# Patient Record
Sex: MALE | Race: WHITE | NOT HISPANIC OR LATINO | Employment: UNEMPLOYED | URBAN - METROPOLITAN AREA
[De-identification: names, ages, dates, MRNs, and addresses within clinical notes are randomized per-mention and may not be internally consistent; named-entity substitution may affect disease eponyms.]

---

## 2023-04-30 ENCOUNTER — HOSPITAL ENCOUNTER (EMERGENCY)
Facility: CLINIC | Age: 33
Discharge: HOME | End: 2023-04-30
Attending: EMERGENCY MEDICINE | Admitting: EMERGENCY MEDICINE

## 2023-04-30 VITALS
SYSTOLIC BLOOD PRESSURE: 131 MMHG | DIASTOLIC BLOOD PRESSURE: 86 MMHG | RESPIRATION RATE: 18 BRPM | HEART RATE: 124 BPM | OXYGEN SATURATION: 97 %

## 2023-04-30 DIAGNOSIS — F10.10 ALCOHOL ABUSE: ICD-10-CM

## 2023-04-30 DIAGNOSIS — F22 PARANOIA (H): ICD-10-CM

## 2023-04-30 LAB — ALCOHOL BREATH TEST: 0 (ref 0–0.01)

## 2023-04-30 PROCEDURE — 250N000013 HC RX MED GY IP 250 OP 250 PS 637: Performed by: EMERGENCY MEDICINE

## 2023-04-30 PROCEDURE — 90791 PSYCH DIAGNOSTIC EVALUATION: CPT

## 2023-04-30 PROCEDURE — 99284 EMERGENCY DEPT VISIT MOD MDM: CPT | Performed by: EMERGENCY MEDICINE

## 2023-04-30 PROCEDURE — 99285 EMERGENCY DEPT VISIT HI MDM: CPT | Mod: 25 | Performed by: EMERGENCY MEDICINE

## 2023-04-30 RX ORDER — OLANZAPINE 10 MG/1
10 TABLET, ORALLY DISINTEGRATING ORAL ONCE
Status: COMPLETED | OUTPATIENT
Start: 2023-04-30 | End: 2023-04-30

## 2023-04-30 RX ORDER — DIVALPROEX SODIUM 500 MG/1
500 TABLET, EXTENDED RELEASE ORAL DAILY
COMMUNITY

## 2023-04-30 RX ADMIN — OLANZAPINE 10 MG: 10 TABLET, ORALLY DISINTEGRATING ORAL at 01:53

## 2023-04-30 RX ADMIN — OLANZAPINE 10 MG: 10 TABLET, ORALLY DISINTEGRATING ORAL at 07:02

## 2023-04-30 ASSESSMENT — COLUMBIA-SUICIDE SEVERITY RATING SCALE - C-SSRS
1. HAVE YOU WISHED YOU WERE DEAD OR WISHED YOU COULD GO TO SLEEP AND NOT WAKE UP?: NO
6. HAVE YOU EVER DONE ANYTHING, STARTED TO DO ANYTHING, OR PREPARED TO DO ANYTHING TO END YOUR LIFE?: NO
TOTAL  NUMBER OF ABORTED OR SELF INTERRUPTED ATTEMPTS LIFETIME: NO
TOTAL  NUMBER OF INTERRUPTED ATTEMPTS LIFETIME: NO
2. HAVE YOU ACTUALLY HAD ANY THOUGHTS OF KILLING YOURSELF?: NO
ATTEMPT LIFETIME: NO

## 2023-04-30 ASSESSMENT — ACTIVITIES OF DAILY LIVING (ADL)
ADLS_ACUITY_SCORE: 35

## 2023-04-30 NOTE — CONSULTS
"Diagnostic Evaluation Consultation  Crisis Assessment    Patient was assessed: In Person  Patient location: [facility] ED  Was a release of information signed: No. Reason: patient refused       Referral Data and Chief Complaint  Ramez is a 32 year old, who uses he/him pronouns, and presents to the ED via EMS. Patient is referred to the ED by self. Patient is presenting to the ED for the following concerns: paranoia.      Informed Consent and Assessment Methods     Patient is his own guardian. Writer met with patient and explained the crisis assessment process, including applicable information disclosures and limits to confidentiality, assessed understanding of the process, and obtained consent to proceed with the assessment. Patient was observed to be able to participate in the assessment as evidenced by engagement. Assessment methods included conducting a formal interview with patient, review of medical records, collaboration with medical staff, and obtaining relevant collateral information from family and community providers when available..     Over the course of this crisis assessment provided reassurance, offered validation and engaged patient in problem solving and disposition planning. Patient's response to interventions was guraded     Summary of Patient Situation    Ramez Rodriguez is a 32 year old male who presents to the ED with paranoid ideations. Throughout the assessment, the patient appeared agitated and generally cooperative. Patient reports that he he just got out of correction about 2 days ago because of probation and has been \"roaming the streets\". Patient did not elaborate further on why he was in correction. Patient reports he has not been sleeping well and had a mental breakdown. Patient sated that he was brought in by ambulance but does not know why this happened. Patient believes that people were after him and that some cars followed him. Patient reports he \"drinks alcohol some times\" but denied history " of drug and alcohol abuse. Patient reports he has been diagnosed with schizophrenia disorder. He was given depakote for his diagnosis; however, he has not been taking it because he misplaced the meds. Patient endorsed auditory hallucinations. Patient denied suicidal or homicidal ideations. Patient denied prior suicide attempts. Patient denied prior psychiatric hospitalization.     Brief Psychosocial History    Patient reports he just got released from assisted a few days ago. Patient refuses to answer other questions regarding his psychosocial background.    Significant Clinical History    Patient reports he was diagnosed with schizophrenia. Patient reports he has been taking Depakote to manage the symptoms. Patient denied prior psychiatric hospitalizations.      Collateral Information    Patient refused to give any contact information for collateral. No emergency contact listed for patient.      Risk Assessment  [place] Suicide Severity Rating Scale Full Clinical Version:  Suicidal Ideation  1. Wish to be Dead (Lifetime): No  2. Non-Specific Active Suicidal Thoughts (Lifetime): No     Suicidal Behavior  Actual Attempt (Lifetime): No  Has subject engaged in non-suicidal self-injurious behavior? (Lifetime): No  Interrupted Attempts (Lifetime): No  Aborted or Self-Interrupted Attempt (Lifetime): No  Preparatory Acts or Behavior (Lifetime): No  C-SSRS Risk (Lifetime/Recent)  Calculated C-SSRS Risk Score (Lifetime/Recent): No Risk Indicated    Validity of evaluation is not impacted by presenting factors during interview .   Comments regarding subjective versus objective responses to [place] tool: none  Environmental or Psychosocial Events: barriers to accessing healthcare, impulsivity/recklessness, unstable housing, homelessness and other life stressors  Chronic Risk Factors: serious, persistent mental illness   Warning Signs: seeking access to means to hurt or kill self, talking or writing about death, dying, or suicide,  acting reckless or engaging in risky activities, increasing substance use or abuse, anxiety, agitation, unable to sleep, sleeping all the time and engaging in self-destructive behavior  Protective Factors: help seeking  Interpretation of Risk Scoring, Risk Mitigation Interventions and Safety Plan: low risk     Does the patient have thoughts of harming others? No     Is the patient engaging in sexually inappropriate behavior?  no        Current Substance Abuse     Is there recent substance abuse? unknown     Was a urine drug screen or blood alcohol level obtained: No patient declined       Mental Status Exam     Affect: Labile   Appearance: Appropriate    Attention Span/Concentration: Attentive  Eye Contact: Variable   Fund of Knowledge: Delayed    Language /Speech Content: Fluent   Language /Speech Volume: Normal    Language /Speech Rate/Productions: Normal    Recent Memory: Variable   Remote Memory: Variable   Mood: Anxious and Irritable    Orientation to Person: Yes    Orientation to Place: Yes   Orientation to Time of Day: No    Orientation to Date: No    Situation (Do they understand why they are here?): Yes    Psychomotor Behavior: Agitated    Thought Content: Paranoia   Thought Form: Intact      History of commitment: Unknown- unable to search for records     Medication    Psychotropic medications: yes but reportedly has stopped taking for about 2 days  Medication changes made in the last two weeks: No       Current Care Team    Primary Care Provider: No  Psychiatrist: No  Therapist: No  : No     CTSS or ARMHS: No  ACT Team: No  Other: No      Diagnosis    298.9 (F29)  Unspecified Schizophrenia Spectrum     Clinical Summary and Substantiation of Recommendations    Ramez Rodriguez is a 32 year old male who presents to the ED with paranoid ideations. Patient reports he has not been sleeping well and had a mental breakdown. Patient endorses paranoia that people were after him and that some cars followed  "him. Patient reports he has been diagnosed with schizophrenia disorder. Patient endorses auditory hallucinations. He was given depakote for his diagnosis; however, he has not been taking it because he misplaced the meds. Patient reports he \"drinks alcohol some times\" but denied history of drug and alcohol abuse. Patient denied suicidal or homicidal ideations. Patient denied prior suicide attempts. Patient denied prior psychiatric hospitalization. Patient denied any suicidal or homicidal ideation. Patient appear to be in altered mental state and could benefit from continued observation in the ED to help with symptoms stabilization.   A lower level of care has been unsuccessful in treating and stabilizing patient s mental health symptoms. However, with brief observation, monitored therapeutic treatment, and intervention of mental health symptoms in the ED, symptoms may be mitigated with potential for disposition to a less restrictive level of care than an inpatient setting. Patient is not currently on the inpatient worklist. Extended Care will follow, working to address safety planning and helping patient establish mental health services.   Disposition    Recommended disposition: Individual Therapy and Medication Management       Reviewed case and recommendations with attending provider. Attending Name: Dr. Packer      Attending concurs with disposition: No       Patient and/or validated legal guardian concurs with disposition: Yes       Final disposition: Medication management.     Outpatient Details (if applicable):   Aftercare plan and appointments placed in the AVS and provided to patient: Yes. Given to patient by RN    Was lethal means counseling provided as a part of aftercare planning? No;       Assessment Details    Patient interview started at: 5:40am and completed at: 6:30am.     Total duration spent on the patient case in minutes: 1.0 hrs      CPT code(s) utilized: 69562 - Psychotherapy for Crisis - 60 " "(30-74*) min       Kristen Little, Psychotherapist Trainee, Psychotherapist  DEC - Triage & Transition Services  Callback: [phone]      Aftercare Plan  If I am feeling unsafe or I am in a crisis, I will:   Contact my established care providers   Call the National Suicide Prevention Lifeline: 988  Go to the nearest emergency room   Call 911     Warning signs that I or other people might notice when a crisis is developing for me: I use drugs, stop taking my meds.    Things I am able to do on my own to cope or help me feel better: take my meds regularly, listen to music or do other things to distract myself.      Things that I am able to do with others to cope or help me better: call the crisis line, ask my brother or other for help     Things I can use or do for distraction: listen to music     Changes I can make to support my mental health and wellness: take my meds regularly and not use drugs or drink alcohol     People in my life that I can ask for help: my brother or call the crisis line     Your [facility] has a mental health crisis team you can call 24/7: [facility] Mobile Crisis  [phone]     Additional resources and information:         Crisis Lines  Crisis Text Line  Text 541849  You will be connected with a trained live crisis counselor to provide support.    Por espanol, texto  NACHO a 678876 o texto a 442-AYUDAME en WhatsA    The [employer] (LGBTQ Youth Crisis Line)  3.481.999.7304  text START to 353-072      Community Resources  Fast Tracker  Linking people to mental health and substance use disorder resources  fasttrackermn.org     [place] Mental Health Warm Line  Peer to peer support  Monday thru Saturday, 12 pm to 10 pm  841.248.4313 or 5.154.370.6124  Text \"Support\" to 97677    National Wheeler on Mental Illness (RIN)  834.820.0892 or 1.888.RIN.HELPS      Mental Health Apps  My3  https://myWeSpirepp.org/    VirtualHopeBox  " https://Jack Robie/apps/virtual-hope-box/      Additional Information  Today you were seen by a licensed mental health professional through Triage and Transition services, [facility] (P)  for a crisis assessment in the Emergency Department at [facility].  It is recommended that you follow up with your established providers (psychiatrist, mental health therapist, and/or primary care doctor - as relevant) as soon as possible. Coordinators from [facility] will be calling you in the next 24-48 hours to ensure that you have the resources you need.  You can also contact [facility] coordinators directly at [phone]. You may have been scheduled for or offered an appointment with a mental health provider. [facility] maintains an extensive network of licensed behavioral health providers to connect patients with the services they need.  We do not charge providers a fee to participate in our referral network.  We match patients with providers based on a patient's specific needs, insurance coverage, and location.  Our first effort will be to refer you to a provider within your care system, and will utilize providers outside your care system as needed.          Emergency Shelter Services      [facility] Hotline: 1-994.364.3364    [facility] sponsors the First Call for Help referral services; they have listings for a wide variety of community support services and can be reached at 211.       [facility]  Overnight emergency shelter for men and women is provided at [facility] [place]  Address:  [facility]  [address] [place] [address]  [phone]      [address]  Phone: [phone]  Emergency Housing: Provides shelter, personal hygiene items and meals for homeless single adults who are receiving GA, social security or other benefits. Access through [facility] Shelter Team at [address] (5-11 pm daily) or [address]  [facility]. Open to men and women.    [facility]: A secure shelter for single homeless women. Beds are available first come, first serve each night, starting at 4 pm. Open to women only.    [facility]  [facility]  Phone: [phone]  Provides: Overnight shelter for adult men (18 and up). Doors open at 7:15 pm and close at 8:00 am.  Remarks: Men need to have resided in [facility] for the past 30 days to be eligible. They need to have no financial resources to house themselves. There is a total of 20 beds in the shelter.  Resources: Independent Living Skills classes, Computer Lab access, meal, laundry    [facility], [phone]  [address] [place] [address]  9a-5:30p Monday through Friday or 1p-5:30p on Saturdays and Sundays.  Must bring a photo ID.  Staff will help with a community card and assign a shelter.    [facility] ([employer])  During the day call [phone] or after 5pm call [phone] (men), [phone] (women)  [address] [place] [address]    [facility] ([employer])  [phone] ext. 11  [address] [facility]    [facility] ([employer]), [phone]  [address] [facility]      Housing Resources by [facility]:  Call 211 to inquire about openings.  https://www.[email or web address]/  268-958-5242  [address] [place] [address] [address], [place] [address]  Emergency and transitional housing for homeless adults; no alcohol or drugs; charge of 30% of income up to $28/night but will be considered if no income      [place]    [facility] (ages 18-24 only) - [place] open Monday - Friday 9am-5pm  [address] [facility] - [place] Open M-F 9am-4pm in 0 degrees or colder  [address] [facility]  [facility] available weekdays from 8:30 a.m.-4:30 p.m.  [address] ([phone])    [facility]    www.detoxone.org  [phone]    [facility] [address], [place]   A sober homeless shelter for those men ages 18 & up, who have recently completed chemical dependency treatment, but are not ready to enter day to day living experiences; men being released from alf or long term; men who have found themselves unable to find employment or have recently lost a job; men who are on probation.      [facility] ("[employer]  www.[facility].org  [phone]  [address] [facility] for families    [facility] available weekdays from 8:30 a.m.-4:30 p.m.  [address] [facility] ([phone])      [facility]  [address] [facility]  [phone]  Physical Health:  urgent care, health screenings, primary care referrals, prescriptions and medication management.  Human Services:  housing, cash, and food support, parenting education, employment resources, disability and veterans resources.  Mental Health & Addiction:  mental health screening and diagnostic assessments, comprehensive screening for addiction disorders, case management and care coordination, support from people with lived experience, help in a crisis including 3-10 day stay at the crisis stabilization program, withdrawal support from drugs and alcohol.    [facility] ("[employer]  www.[facility].org  175.410.4302  [address] [place] [address]  Drop in center for homeless adults; supports basic needs, personal empowerment and transitional services; see web site for hours    [facility] and Pay-for-Stay Shelter ("[employer]  www.[facility].org  286.705.2009  [address] [facility]  Beds for men,  both programs offer light breakfast and supper and showers; Pay-for-Stay program also has lockers, charges $6/night which is held in escrow for deposit on permanent housing    [facility] ([place])  www.[facility].org  225-321-2021  [address] [facility]  The [facility] offers 88 units of single room occupancy permanent housing for independent, single adults experiencing homelessness. Facilities are available for 72 men and 16 women, who each pay $360 a month. Workshops on money management and empowerment help residents develop their potential. Optional spiritual guidance, onsite Alcoholics Anonymous meetings and a men's support group help residents confront their barriers to self-sufficiency.    [facility] ([employer])  www.[facility].org  951-334-2966  [address] [place] [address]  The [facility] provides low-cost single-room occupancy transitional housing to single men and women in [facility]. Residents work toward permanent housing by using the site's supportive services, including empowerment groups and case management to meet residents' mental health and emotional needs. Residents establish a rental history and can stay in the program for up to two years; $350 monthly rent for transitional housing units; all other units covered under Group Residential Housing Plan assistance; Alcohol and chemical use prohibited     [facility] ([employer])    www.[facility].org   [phone]   [address] [facility] offers 80 units of permanent housing to late-stage, chronic alcoholics, based on a model known as harm reduction. Many low-income, homeless, chronic alcoholics have struggled to attain sobriety. [facility] serves chronic alcoholics in [facility] with repeated admissions to detoxification centers and a history of failure in traditional chemical dependency treatment programs. At least 75  percent of residents have mental health issues that contribute to the cycle of alcoholism [facility] provides residential care in a homelike environment for its tenants, regardless of level of intoxication, although alcohol is not allowed in the building. This cost-effective and compassionate housing option costs $47.25 a night. A large portion of the rent can be subsidized.     [facility] ([employer]), [phone]   www.[facility].org  [address] [facility]  Housing for single, homeless men and women; both short term and long term (maximum 2 years) cost 30% of income, income cannot exceed $26,800/year; see program requirements for waiting list rules.    [facility], [phone]  https://[facility].org/  [address], [place]   Transitional housing for single women and women with children for up to 2 years; must be unemployed and not in school, no more than 4 children ages 10 and under; fees determined by .    [facility] ([facility]), [phone]  www.[employer].org  [address] [facility]  [facility] provides intake and assessment services for any family seeking emergency shelter in [facility]. This address is a day program, will bus those needing overnight shelter to a Jehovah's witness, location of overnight housing changes monthly.    [facility] ([employer]), [phone]  www.[facility].org  [address] [facility] provides permanent housing with supportive services to 70 single men and women who commit to building better lives for themselves. [facility] offers a critical stepping stone for people with low incomes, allowing them to grow, advance in skills and reach self sufficiency.    [facility] ([employer]), [phone]  www.[employer].org  [address] [facility]  For single men and women; Portion of lodging  expenses may be subsidized, Applicants must meet the definition of long-term homelessness and have an annual income of $14,800 or less, Desire to live in an alcohol- and drug-free environment.    [facility] ([employer]), [phone]  www.[facility].org  [facility] provides a fresh start for single mothers and their children with a supportive, respectful, affordable housing situation. [facility] contains 16 two- and three-bedroom apartment units in a convenient [place] location.    [facility] ([employer])    www.[facility].org   [phone]    [facility] provides permanent housing for late-stage chronic alcoholic men in [facility] [place] [facility] with repeated admissions to detoxification centers and a history of failure in traditional chemical dependency treatment programs. This cost-effective and compassionate housing option costs less than $50 a night.

## 2023-04-30 NOTE — ED PROVIDER NOTES
[facility] [place]    4/30/23         History     Chief Complaint   Patient presents with     Paranoid     The history is provided by the patient and medical records.     Ramez Rodriguez is a 32 year old male who with no relevant PMH in epic to the ED with paranoid ideations.  Patient reports that he has had a mental breakdown.  He really does not know why this happened.  He himself called ambulance by the [employer] and was wanting to go to the emergency department.  He believes that he is having a very bad panic attack and this is happened before.  Patient believes that people are after him and states that he does not have any affiliation with anybody. Furthermore, he reported that he was kicked out from the homeless shelter.  No history of drug and alcohol abuse.  Patient reports that he is here to be mentally seen for his schizophrenia diagnosis. He was given depakote for his diagnosis; however, he has not been taking it.  He was released from detention 2 days ago.    Past Medical History  No past medical history on file.  No past surgical history on file.  divalproex sodium extended-release (DEPAKOTE ER) 500 MG 24 hr tablet      No Known Allergies  Family History  No family history on file.  Social History   Social History     Tobacco Use     Smoking status: Every Day     Types: Cigarettes   Substance Use Topics     Alcohol use: Yes     Comment: last drink 2 hours ago 1/2 can of beer     Drug use: Not Currently      Past medical history, past surgical history, medications, allergies, family history, and social history were reviewed with the patient. No additional pertinent items.      A complete review of systems was performed with pertinent positives and negatives noted in the HPI, and all other systems negative.    Physical Exam   BP: 126/57  Pulse: 80  Temp:  (refused)  Resp: 16  SpO2: 98 %  Physical Exam  Vitals and nursing note reviewed.   Constitutional:       General: He is in  acute distress.   Psychiatric:         Attention and Perception: He is inattentive.         Mood and Affect: Mood is anxious. Affect is labile and angry.         Speech: Speech is rapid and pressured.         Behavior: Behavior is agitated and hyperactive.         Thought Content: Thought content does not include suicidal ideation.         Judgment: Judgment is impulsive and inappropriate.         ED Course, Procedures, & Data     1:37 AM  The patient was seen and examined by Sean Packer MD.  in [facility]   Procedures                Results for orders placed or performed during the hospital encounter of 04/30/23   Alcohol breath test POCT     Status: Normal   Result Value Ref Range    Alcohol Breath Test 0.00 0.00 - 0.01     Medications   OLANZapine zydis (zyPREXA) ODT tab 10 mg (10 mg Oral $Given 4/30/23 0153)   OLANZapine zydis (zyPREXA) ODT tab 10 mg (10 mg Oral $Given 4/30/23 0702)     Labs Ordered and Resulted from Time of ED Arrival to Time of ED Departure   ALCOHOL BREATH TEST POCT - Normal       Result Value    Alcohol Breath Test 0.00       No orders to display      The patient was released from longterm 2 days ago.  He refuses to give a urine sample.  I suspect his paranoia may be related to methamphetamine abuse.  I do not know why else he would refuse to provide a sample.  He is angry that I will not help him get into rehab.  His main issue at this point is being homeless.  He said that he wanted to go to detox for alcohol however at most he could have been drinking for 2 days.  I think his main motivation is not being homeless.  I told him that cooperating with the evaluation which included providing urine sample would be his best way forward.    Critical care was not performed.     Medical Decision Making  The patient's presentation was of moderate complexity (an acute illness with systemic symptoms).    The patient's evaluation involved:  ordering and/or review of 2 test(s) in this encounter  (Urine toxicology which he declined, breathalyzer test)    The patient's management necessitated moderate risk (limitations due to social determinants of health (Patient is homeless recently read released from senior living I suspect drug abuse because of his resistance to provide a urine sample.  In my opinion does not meet criteria for alcohol detox.)).      Assessment & Plan    32-year-old male who is uncooperative unwilling to provide a urine sample belligerent angry and appropriate.  He was just released from senior living a couple days ago.  He was angry that I could not find a place in a rehabilitation center for him.  He says that he needs alcohol detox but at most he has been drinking for 2 days.  He is not currently intoxicated.  My gut feeling is that his paranoia is likely from drug ingestion probable methamphetamine but I do not have any proof for this.  I do not see anything previously in his records.  He was not on any psychiatric medications when he was in senior living.  I told him that he could go to [address] [place] [address] for his detox needs and I will provide temporary housing he was happy about this and he was discharged with a bus token.  I have reviewed the nursing notes. I have reviewed the findings, diagnosis, plan and need for follow up with the patient.    Discharge Medication List as of 4/30/2023  7:36 AM          Final diagnoses:   Paranoia (H)   Alcohol abuse   I, DESIREE GROVE, am serving as a trained medical scribe to document services personally performed by Sean Packer MD, based on the provider's statements to me.     I, Sean Packer MD, was physically present and have reviewed and verified the accuracy of this note documented by DESIREE GROVE.    Sean Packer MD.    [facility] EMERGENCY DEPARTMENT  4/30/2023     Sean Packer MD  05/04/23 2115       Sean Packer MD  05/04/23 2116

## 2023-04-30 NOTE — ED NOTES
Bed: ENE  Expected date:   Expected time:   Means of arrival:   Comments:  Rhoda Ni 32 y/o M Psych Eval

## 2023-04-30 NOTE — ED NOTES
Patient still paranoid but agrees to be calm and speak to DEC accessor. Door unlocked/seclusion ended and therapist in to speak with patient.

## 2023-04-30 NOTE — ED NOTES
"Asked patient if he could give us a urine sample and he refused asking why and states \"I don't feel that's necessary\". Patient appears very paranoid.   "

## 2023-04-30 NOTE — ED TRIAGE NOTES
Picked up in parking lot called ambulance stated was feeling short of breath.  When ambulance arrived pt stated was being followed by car.  Pt feeling paranoid.   Triage Assessment     Row Name 04/30/23 0117       Triage Assessment (Adult)    Airway WDL WDL       Respiratory WDL    Respiratory WDL WDL       Skin Circulation/Temperature WDL    Skin Circulation/Temperature WDL WDL       Cardiac WDL    Cardiac WDL WDL       Peripheral/Neurovascular WDL    Peripheral Neurovascular WDL WDL       Cognitive/Neuro/Behavioral WDL    Cognitive/Neuro/Behavioral WDL WDL

## 2023-04-30 NOTE — ED NOTES
Writer observed pt covering camera in his room with damp paper towel. Security notified, barrier removed

## 2023-04-30 NOTE — ED NOTES
Pt.  attempting to leave, paranoid,  frequently stating he fears for his life do to people being after him.  Pt. refusing to stay in room.  Made attempt to run out of the door, stopped by security.  Pt. on TOM.  Pt. walked back to seclusion by security without incident.

## 2023-04-30 NOTE — ED NOTES
Pt called ambulance stating had shortness of breath once ambulance arrived pt stated that there is a car following him.

## 2023-04-30 NOTE — DISCHARGE INSTRUCTIONS
You should get back on your medications  You have requested to go to detox, 2 options are below    [address] [facility]                     [phone]    [facility], [place], [place]        [phone]